# Patient Record
Sex: FEMALE | Race: WHITE | Employment: OTHER
[De-identification: names, ages, dates, MRNs, and addresses within clinical notes are randomized per-mention and may not be internally consistent; named-entity substitution may affect disease eponyms.]

---

## 2023-09-28 ENCOUNTER — APPOINTMENT (OUTPATIENT)
Facility: HOSPITAL | Age: 83
End: 2023-09-28
Payer: MEDICARE

## 2023-09-28 ENCOUNTER — HOSPITAL ENCOUNTER (EMERGENCY)
Facility: HOSPITAL | Age: 83
Discharge: HOME | End: 2023-09-28
Attending: EMERGENCY MEDICINE
Payer: MEDICARE

## 2023-09-28 VITALS
HEART RATE: 76 BPM | RESPIRATION RATE: 16 BRPM | TEMPERATURE: 98 F | SYSTOLIC BLOOD PRESSURE: 144 MMHG | OXYGEN SATURATION: 100 % | DIASTOLIC BLOOD PRESSURE: 72 MMHG
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
## 2023-09-28 DIAGNOSIS — M25.552 LEFT HIP PAIN: ICD-10-CM

## 2023-09-28 PROCEDURE — 73502 X-RAY EXAM HIP UNI 2-3 VIEWS: CPT

## 2023-09-28 PROCEDURE — 99283 EMERGENCY DEPT VISIT LOW MDM: CPT

## 2023-09-28 NOTE — ED TRIAGE NOTES
Pt arrives via EMS from the [facility] for complaints of a trip and fall. Fall was witnessed by staff and denies hitting head or LOC. Pt complained of left hip pain after fall. Pt was able to walk to EMS stretcher after fall with steady gait. PMH of dementia.

## 2023-09-28 NOTE — ED NOTES
Patient does not appear to be in any acute distress/shows no evidence of clinical instability at this time. Provider has reviewed discharge instructions with [facility]. [facility] staff verbalized understanding instructions as well as need for follow up for any further symptoms. Discharge papers given, education provided, and any questions answered. Patient discharged by provider to [facility] staff for transport home. This rn attempting to call report to [facility].       Reid Houser RN  09/28/23 [id number]

## 2023-09-28 NOTE — ED NOTES
Recalled [facility] for an update, informed they will be here at 2:15 now.       Ruth Combenzo  09/28/23 1606

## 2023-09-28 NOTE — ED NOTES
[facility] transport back to [address] [facility],[address], [address] booked for ETA 12:45.      aMrla Trevino  09/28/23 [id number]

## 2023-12-16 PROBLEM — N17.9 AKI (ACUTE KIDNEY INJURY) (HCC): Status: ACTIVE | Noted: 2023-12-16

## 2024-01-14 ENCOUNTER — HOSPITAL ENCOUNTER (EMERGENCY)
Facility: HOSPITAL | Age: 84
Discharge: HOME | End: 2024-01-14
Attending: EMERGENCY MEDICINE
Payer: MEDICARE

## 2024-01-14 ENCOUNTER — HOSPITAL ENCOUNTER (EMERGENCY)
Facility: HOSPITAL | Age: 84
Discharge: HOME | End: 2024-01-17
Payer: MEDICARE

## 2024-01-14 VITALS
WEIGHT: 107 LBS | OXYGEN SATURATION: 100 % | DIASTOLIC BLOOD PRESSURE: 58 MMHG | RESPIRATION RATE: 16 BRPM | BODY MASS INDEX: 18.27 KG/M2 | TEMPERATURE: 98.7 F | SYSTOLIC BLOOD PRESSURE: 123 MMHG | HEART RATE: 70 BPM | HEIGHT: 64 IN
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
## 2024-01-14 PROCEDURE — 99284 EMERGENCY DEPT VISIT MOD MDM: CPT

## 2024-01-14 PROCEDURE — 72125 CT NECK SPINE W/O DYE: CPT

## 2024-01-14 PROCEDURE — 70450 CT HEAD/BRAIN W/O DYE: CPT

## 2024-01-14 ASSESSMENT — PAIN - FUNCTIONAL ASSESSMENT: PAIN_FUNCTIONAL_ASSESSMENT: NONE - DENIES PAIN

## 2024-01-14 NOTE — ED NOTES
Discharge instructions were reviewed and given to the patient. Patient given a current medication reconciliation form and verbalized understanding of their medications, side affects, medication administration, and time when due. Importance of follow-up and appointment times and dates reviewed. The patient verbalized understanding of discharge instructions and prescriptions, all questions were answered. Patient has no further concerns at this time. Patient stable at time of discharge.

## 2024-01-14 NOTE — ED TRIAGE NOTES
Patient is an 83 year old female complaining of headache post GLF. Patient reports walking on the sidewalk and tripping with . Patient states the headache is gone. Patient comes from memory care facility via EMS. Patient alert and oriented to person/. In NAD.

## 2024-01-14 NOTE — ED PROVIDER NOTES
[facility] EMERGENCY DEPT  EMERGENCY DEPARTMENT ENCOUNTER      Pt Name: Lana Lester  MRN: [id number]  Birthdate 1940  Date of evaluation: 1/14/2024  Provider: Gurmeet Haley DO      HISTORY OF PRESENT ILLNESS      HPI  83-year-old female presents to the emergency department by EMS from her [facility] care facility after she was reportedly walking on the sidewalk and tripped and fell and hit her head.  She states that she had a headache earlier but now it is gone.  She denies any other injuries.  She is at her reported neurologic baseline.  She is on no blood thinners.      Nursing Notes were reviewed.    REVIEW OF SYSTEMS         Review of Systems        PAST MEDICAL HISTORY   No past medical history on file.      SURGICAL HISTORY     No past surgical history on file.      CURRENT MEDICATIONS       Previous Medications    CLOTRIMAZOLE 1 % OINT    Apply topically nightly as needed (Apply to rash as needed)    DONEPEZIL (ARICEPT) 10 MG TABLET    Take 1 tablet by mouth nightly    LATANOPROST (XALATAN) 0.005 % OPHTHALMIC SOLUTION    Place 1 drop into both eyes nightly    MEMANTINE (NAMENDA) 10 MG TABLET    Take 1 tablet by mouth daily    MIRTAZAPINE (REMERON) 15 MG TABLET    Take 1 tablet by mouth nightly    MULTIPLE VITAMINS-MINERALS (PRESERVISION AREDS 2 PO)    Take 2 capsules by mouth daily    POLYETHYLENE GLYCOL (MIRALAX) 17 G PACK PACKET    Take 17 g by mouth daily    QUETIAPINE (SEROQUEL) 25 MG TABLET    Take 3 tablets by mouth nightly    VENLAFAXINE (EFFEXOR XR) 150 MG EXTENDED RELEASE CAPSULE    Take 1 capsule by mouth daily       ALLERGIES     Aspirin, Nsaids, and Sulfa antibiotics    FAMILY HISTORY     No family history on file.       SOCIAL HISTORY       Social History     Socioeconomic History    Marital status:          PHYSICAL EXAM       ED Triage Vitals   BP Temp Temp src Pulse Resp SpO2 Height Weight   -- -- -- -- -- -- -- --       There is no height or weight on file to calculate

## 2024-10-19 ENCOUNTER — APPOINTMENT (OUTPATIENT)
Facility: HOSPITAL | Age: 84
End: 2024-10-19
Payer: MEDICARE

## 2024-10-19 ENCOUNTER — HOSPITAL ENCOUNTER (EMERGENCY)
Facility: HOSPITAL | Age: 84
Discharge: HOME | End: 2024-10-19
Attending: EMERGENCY MEDICINE
Payer: MEDICARE

## 2024-10-19 VITALS
WEIGHT: 114 LBS | HEART RATE: 62 BPM | SYSTOLIC BLOOD PRESSURE: 124 MMHG | OXYGEN SATURATION: 98 % | DIASTOLIC BLOOD PRESSURE: 81 MMHG | HEIGHT: 64 IN | BODY MASS INDEX: 19.46 KG/M2 | TEMPERATURE: 98.8 F
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
## 2024-10-19 PROCEDURE — 73521 X-RAY EXAM HIPS BI 2 VIEWS: CPT

## 2024-10-19 PROCEDURE — 99283 EMERGENCY DEPT VISIT LOW MDM: CPT

## 2024-10-19 ASSESSMENT — PAIN - FUNCTIONAL ASSESSMENT: PAIN_FUNCTIONAL_ASSESSMENT: 0-10

## 2024-10-19 ASSESSMENT — ENCOUNTER SYMPTOMS
VOMITING: 0
CHEST TIGHTNESS: 0
FACIAL SWELLING: 0
BACK PAIN: 0
COUGH: 0
NAUSEA: 0
EYE DISCHARGE: 0
SHORTNESS OF BREATH: 0
VISUAL CHANGE: 0
ABDOMINAL PAIN: 0
SORE THROAT: 0
EYE PAIN: 0
DIARRHEA: 0
BOWEL INCONTINENCE: 0

## 2024-10-19 NOTE — ED TRIAGE NOTES
Pt in [facility] for a GLF that was unwitnessed by staff. Ems states that staff denies the pt lost consciousness.    Pt is not on any blood thinners but does have a hx of dementia    Pt complains of hip pain from fall

## 2024-10-19 NOTE — ED PROVIDER NOTES
[facility] EMERGENCY DEPT  EMERGENCY DEPARTMENT ENCOUNTER      Pt Name: Lana Lester  MRN: [id number]  Birthdate 1940  Date of evaluation: 10/19/2024  Provider: Raymond Jauregui MD    CHIEF COMPLAINT       Chief Complaint   Patient presents with    Fall         HISTORY OF PRESENT ILLNESS   (Location/Symptom, Timing/Onset, Context/Setting, Quality, Duration, Modifying Factors, Severity)  Note limiting factors.   84-year-old female comes from [facility] where she was found on the ground.  There is suspicion of right hip pain.  Patient is able to move both hips freely without any reaction or evidence of pain.  No other injuries noted.    The history is provided by the patient.   Fall  The accident occurred Less than 1 hour ago. The fall occurred while standing. She fell from a height of 1 to 2 ft. She landed on A hard floor. The point of impact was the right hip. The patient is experiencing no pain. She was Not ambulatory at the scene. There was No entrapment after the fall. There was No drug use involved in the accident. There was No alcohol use involved in the accident. Pertinent negatives include no visual change, no fever, no numbness, no abdominal pain, no bowel incontinence, no nausea, no vomiting, no hematuria and no headaches.         Review of External Medical Records:     Nursing Notes were reviewed.    REVIEW OF SYSTEMS    (2-9 systems for level 4, 10 or more for level 5)     Review of Systems   Constitutional:  Negative for activity change, appetite change, chills, diaphoresis and fever.   HENT:  Negative for congestion, ear pain, facial swelling and sore throat.    Eyes:  Negative for pain, discharge and visual disturbance.   Respiratory:  Negative for cough, chest tightness and shortness of breath.    Cardiovascular:  Negative for chest pain and palpitations.   Gastrointestinal:  Negative for abdominal pain, bowel incontinence, diarrhea, nausea and vomiting.   Endocrine: Negative for cold

## 2024-12-25 ENCOUNTER — APPOINTMENT (OUTPATIENT)
Facility: HOSPITAL | Age: 84
End: 2024-12-25
Payer: MEDICARE

## 2024-12-25 ENCOUNTER — HOSPITAL ENCOUNTER (EMERGENCY)
Facility: HOSPITAL | Age: 84
Discharge: HOME | End: 2024-12-25
Attending: EMERGENCY MEDICINE
Payer: MEDICARE

## 2024-12-25 VITALS
RESPIRATION RATE: 16 BRPM | DIASTOLIC BLOOD PRESSURE: 51 MMHG | BODY MASS INDEX: 19.63 KG/M2 | TEMPERATURE: 98.4 F | HEART RATE: 70 BPM | WEIGHT: 115 LBS | OXYGEN SATURATION: 98 % | SYSTOLIC BLOOD PRESSURE: 110 MMHG | HEIGHT: 64 IN

## 2024-12-25 DIAGNOSIS — S01.111A LACERATION OF RIGHT EYEBROW, INITIAL ENCOUNTER: ICD-10-CM
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
## 2024-12-25 DIAGNOSIS — H05.231 PERIORBITAL HEMATOMA OF RIGHT EYE: ICD-10-CM

## 2024-12-25 PROCEDURE — 99284 EMERGENCY DEPT VISIT MOD MDM: CPT

## 2024-12-25 PROCEDURE — 90714 TD VACC NO PRESV 7 YRS+ IM: CPT | Performed by: EMERGENCY MEDICINE

## 2024-12-25 PROCEDURE — 72125 CT NECK SPINE W/O DYE: CPT

## 2024-12-25 PROCEDURE — 12011 RPR F/E/E/N/L/M 2.5 CM/<: CPT

## 2024-12-25 PROCEDURE — 70486 CT MAXILLOFACIAL W/O DYE: CPT

## 2024-12-25 PROCEDURE — 6370000000 HC RX 637 (ALT 250 FOR IP): Performed by: EMERGENCY MEDICINE

## 2024-12-25 PROCEDURE — 6360000002 HC RX W HCPCS: Performed by: EMERGENCY MEDICINE

## 2024-12-25 PROCEDURE — 70450 CT HEAD/BRAIN W/O DYE: CPT

## 2024-12-25 PROCEDURE — 90471 IMMUNIZATION ADMIN: CPT | Performed by: EMERGENCY MEDICINE

## 2024-12-25 RX ORDER — GINSENG 100 MG
CAPSULE ORAL
Status: COMPLETED | OUTPATIENT
Start: 2024-12-25 | End: 2024-12-25

## 2024-12-25 RX ADMIN — Medication 3 ML: at 16:30

## 2024-12-25 RX ADMIN — BACITRACIN 1 EACH: 500 OINTMENT TOPICAL at 17:49

## 2024-12-25 RX ADMIN — CLOSTRIDIUM TETANI TOXOID ANTIGEN (FORMALDEHYDE INACTIVATED) AND CORYNEBACTERIUM DIPHTHERIAE TOXOID ANTIGEN (FORMALDEHYDE INACTIVATED) 0.5 ML: 5; 2 INJECTION, SUSPENSION INTRAMUSCULAR at 16:31

## 2024-12-25 ASSESSMENT — PAIN - FUNCTIONAL ASSESSMENT: PAIN_FUNCTIONAL_ASSESSMENT: NONE - DENIES PAIN

## 2024-12-25 NOTE — DISCHARGE INSTRUCTIONS
Your laceration was closed with absorbable sutures.  Please keep the wound clean and dry as best as possible monitoring for signs of infection.

## 2024-12-25 NOTE — ED PROVIDER NOTES
[facility] EMERGENCY DEPT  EMERGENCY DEPARTMENT ENCOUNTER      Pt Name: Lana Lester  MRN: [id number]  Birthdate 1940  Date of evaluation: 12/25/2024  Provider: Gurmeet Haley DO      HISTORY OF PRESENT ILLNESS      HPI    84-year-old female presents to the emergency department from her memory care facility after she had an unwitnessed fall that occurred today.  She is oriented to self at baseline and at her neurologic baseline per EMS.  She was noted to have a laceration to her right eyebrow and bruising surrounding that laceration.  She is on no blood thinners per EMS.  History is provided by EMS and limited from the patient secondary to dementia.  No other injuries sustained.  On arrival the patient states that she lost her balance but denies any pain other than in her head/eyebrow.    Nursing Notes were reviewed.    REVIEW OF SYSTEMS         Review of Systems        PAST MEDICAL HISTORY   No past medical history on file.      SURGICAL HISTORY     No past surgical history on file.      CURRENT MEDICATIONS       Previous Medications    CLOTRIMAZOLE 1 % OINT    Apply topically nightly as needed (Apply to rash as needed)    DONEPEZIL (ARICEPT) 10 MG TABLET    Take 1 tablet by mouth nightly    LATANOPROST (XALATAN) 0.005 % OPHTHALMIC SOLUTION    Place 1 drop into both eyes nightly    MEMANTINE (NAMENDA) 10 MG TABLET    Take 1 tablet by mouth daily    MIRTAZAPINE (REMERON) 15 MG TABLET    Take 1 tablet by mouth nightly    MULTIPLE VITAMINS-MINERALS (PRESERVISION AREDS 2 PO)    Take 2 capsules by mouth daily    POLYETHYLENE GLYCOL (MIRALAX) 17 G PACK PACKET    Take 17 g by mouth daily    QUETIAPINE (SEROQUEL) 25 MG TABLET    Take 3 tablets by mouth nightly    VENLAFAXINE (EFFEXOR XR) 150 MG EXTENDED RELEASE CAPSULE    Take 1 capsule by mouth daily       ALLERGIES     Aspirin, Nsaids, and Sulfa antibiotics    FAMILY HISTORY     No family history on file.       SOCIAL HISTORY       Social History     Socioeconomic

## 2024-12-25 NOTE — ED TRIAGE NOTES
Pt arrived via EMS from [facility] c/o unwitnessed GLF that occurred today. Pt oriented to self only, baseline per EMS. Pt states she lost her balance, denies pain other than head. Laceration noted to R eyebrow and bruise noted to R cheek. Not on blood thinners [er EMS.

## 2025-07-21 ENCOUNTER — HOSPITAL ENCOUNTER (EMERGENCY)
Facility: HOSPITAL | Age: 85
Discharge: HOME | End: 2025-07-24
Payer: MEDICARE

## 2025-07-21 ENCOUNTER — HOSPITAL ENCOUNTER (EMERGENCY)
Facility: HOSPITAL | Age: 85
Discharge: HOME | End: 2025-07-21
Attending: EMERGENCY MEDICINE
Payer: MEDICARE

## 2025-07-21 VITALS
WEIGHT: 119.27 LBS | RESPIRATION RATE: 16 BRPM | HEART RATE: 78 BPM | DIASTOLIC BLOOD PRESSURE: 49 MMHG | BODY MASS INDEX: 19.17 KG/M2 | TEMPERATURE: 97.7 F | OXYGEN SATURATION: 98 % | HEIGHT: 66 IN | SYSTOLIC BLOOD PRESSURE: 138 MMHG

## 2025-07-21 DIAGNOSIS — S00.83XA CONTUSION OF FACE, INITIAL ENCOUNTER: Primary | ICD-10-CM

## 2025-07-21 PROCEDURE — 6370000000 HC RX 637 (ALT 250 FOR IP): Performed by: EMERGENCY MEDICINE

## 2025-07-21 PROCEDURE — 99283 EMERGENCY DEPT VISIT LOW MDM: CPT

## 2025-07-21 RX ORDER — ACETAMINOPHEN 325 MG/1
650 TABLET ORAL
Status: COMPLETED | OUTPATIENT
Start: 2025-07-21 | End: 2025-07-21

## 2025-07-21 RX ADMIN — ACETAMINOPHEN 650 MG: 325 TABLET ORAL at 20:42

## 2025-07-21 ASSESSMENT — PAIN - FUNCTIONAL ASSESSMENT: PAIN_FUNCTIONAL_ASSESSMENT: ADULT NONVERBAL PAIN SCALE (NPVS)

## 2025-07-21 NOTE — ED TRIAGE NOTES
Pt brought in by EMS from memory care center ([facility]) for GLF x 2 and head injury after second fall onto tile jessica. Pt does not currently take blood thinners. Is a DNR and has dementia with orientation to self. Pt has obvious contusion to right sided forehead/temporal area with no visible signs of bleeding. She is a poor historian as she is unable to communicate well at baseline

## 2025-07-21 NOTE — ED NOTES
Received a call from the patients Hospice Team- they report the patient is on Hospice and was sent here to have her Laceration repaired and then should return to [facility] where she resides.  When patient is being discharged- please call [phone] and make the hospice team aware she is returning so they can meet her at the facility.

## 2025-07-21 NOTE — ED PROVIDER NOTES
[facility] EMERGENCY DEPARTMENT  EMERGENCY DEPARTMENT ENCOUNTER      Pt Name: Lana Lester  MRN: [id number]  Birthdate 1940  Date of evaluation: 7/21/2025  Provider: Virginie Baron DO    CHIEF COMPLAINT       Chief Complaint   Patient presents with    Fall    Head Injury         HISTORY OF PRESENT ILLNESS   (Location/Symptom, Timing/Onset, Context/Setting, Quality, Duration, Modifying Factors, Severity)  Note limiting factors.   HPI      Review of External Medical Records:     Nursing Notes were reviewed.    REVIEW OF SYSTEMS    (2-9 systems for level 4, 10 or more for level 5)     Review of Systems    Except as noted above the remainder of the review of systems was reviewed and negative.       PAST MEDICAL HISTORY   No past medical history on file.      SURGICAL HISTORY     No past surgical history on file.      CURRENT MEDICATIONS       Previous Medications    CLOTRIMAZOLE 1 % OINT    Apply topically nightly as needed (Apply to rash as needed)    DONEPEZIL (ARICEPT) 10 MG TABLET    Take 1 tablet by mouth nightly    LATANOPROST (XALATAN) 0.005 % OPHTHALMIC SOLUTION    Place 1 drop into both eyes nightly    MEMANTINE (NAMENDA) 10 MG TABLET    Take 1 tablet by mouth daily    MIRTAZAPINE (REMERON) 15 MG TABLET    Take 1 tablet by mouth nightly    MULTIPLE VITAMINS-MINERALS (PRESERVISION AREDS 2 PO)    Take 2 capsules by mouth daily    POLYETHYLENE GLYCOL (MIRALAX) 17 G PACK PACKET    Take 17 g by mouth daily    QUETIAPINE (SEROQUEL) 25 MG TABLET    Take 3 tablets by mouth nightly    VENLAFAXINE (EFFEXOR XR) 150 MG EXTENDED RELEASE CAPSULE    Take 1 capsule by mouth daily       ALLERGIES     Aspirin, Nsaids, and Sulfa antibiotics    FAMILY HISTORY     No family history on file.       SOCIAL HISTORY       Social History     Socioeconomic History    Marital status:            PHYSICAL EXAM    (up to 7 for level 4, 8 or more for level 5)     ED Triage Vitals [07/21/25 1920]   BP Systolic

## 2025-07-22 NOTE — ED NOTES
Discharge instructions reviewed with patient's family member and hospice RN. Patient wheeled out of ED with hospice RN.

## 2025-07-22 NOTE — ED NOTES
TRANSFER - OUT REPORT:    Verbal report given to [facility] RN on Lana Lester  being transferred to [facility] for urgent transfer       Report consisted of patient's Situation, Background, Assessment and   Recommendations(SBAR).     Information from the following report(s) Nurse Handoff Report, Index, ED Encounter Summary, and ED SBAR was reviewed with the receiving nurse.    [place] Fall Assessment:    Presents to emergency department  because of falls (Syncope, seizure, or loss of consciousness): Yes  Age > 70: Yes  Altered Mental Status, Intoxication with alcohol or substance confusion (Disorientation, impaired judgment, poor safety awaremess, or inability to follow instructions): Yes  Impaired Mobility: Ambulates or transfers with assistive devices or assistance; Unable to ambulate or transer.: Yes  Nursing Judgement: Yes          Lines:       Opportunity for questions and clarification was provided.      Patient transported with:  AVS